# Patient Record
Sex: MALE | Race: OTHER | Employment: UNEMPLOYED | URBAN - METROPOLITAN AREA
[De-identification: names, ages, dates, MRNs, and addresses within clinical notes are randomized per-mention and may not be internally consistent; named-entity substitution may affect disease eponyms.]

---

## 2023-01-05 ENCOUNTER — HOSPITAL ENCOUNTER (EMERGENCY)
Facility: HOSPITAL | Age: 33
Discharge: HOME | End: 2023-01-05
Attending: EMERGENCY MEDICINE

## 2023-01-05 VITALS
TEMPERATURE: 98 F | SYSTOLIC BLOOD PRESSURE: 149 MMHG | HEIGHT: 75 IN | WEIGHT: 295 LBS | BODY MASS INDEX: 36.68 KG/M2 | OXYGEN SATURATION: 96 % | RESPIRATION RATE: 18 BRPM | HEART RATE: 72 BPM | DIASTOLIC BLOOD PRESSURE: 91 MMHG

## 2023-01-05 DIAGNOSIS — K04.7 DENTAL INFECTION: Primary | ICD-10-CM

## 2023-01-05 PROCEDURE — 99283 EMERGENCY DEPT VISIT LOW MDM: CPT

## 2023-01-05 RX ORDER — ONDANSETRON 4 MG/1
4 TABLET, ORALLY DISINTEGRATING ORAL EVERY 4 HOURS PRN
Qty: 10 TABLET | Refills: 0 | Status: SHIPPED | OUTPATIENT
Start: 2023-01-05 | End: 2023-01-12

## 2023-01-05 RX ORDER — IBUPROFEN 600 MG/1
600 TABLET ORAL EVERY 8 HOURS PRN
Qty: 30 TABLET | Refills: 0 | Status: SHIPPED | OUTPATIENT
Start: 2023-01-05 | End: 2023-01-12

## 2023-01-05 RX ORDER — AMOXICILLIN AND CLAVULANATE POTASSIUM 875; 125 MG/1; MG/1
1 TABLET, FILM COATED ORAL 2 TIMES DAILY
Qty: 20 TABLET | Refills: 0 | Status: SHIPPED | OUTPATIENT
Start: 2023-01-05 | End: 2023-01-10

## 2023-01-05 NOTE — DISCHARGE INSTRUCTIONS
Take the antibiotic prescribed to you today as directed. Make sure to finish the entire course even if you start to feel better. Take Ibuprofen 600 mg every 8 hours as needed for pain. If you continue to have pain take norco as prescribed for severe pain. Do not drink alcohol, drive, or take acetaminophen (tylenol) while taking norco.  Use an over the counter stool softener such as colace while taking norco.  Risk of addiction to pain medication increases after 3 days, make sure to use this for shortest duration as possible and only for severe pain. During plenty of fluids and get rest.  Take Zofran as needed for nausea. Make sure to see a dentist today as previously scheduled.

## 2023-01-05 NOTE — ED INITIAL ASSESSMENT (HPI)
Pt arrives w/ c/o left lower dental pain x2 weeks. He has an appointment for a dentist tomorrow.  Pt was concerned for the increased swelling and pain